# Patient Record
Sex: MALE | URBAN - METROPOLITAN AREA
[De-identification: names, ages, dates, MRNs, and addresses within clinical notes are randomized per-mention and may not be internally consistent; named-entity substitution may affect disease eponyms.]

---

## 2024-01-12 ENCOUNTER — OFFICE VISIT (OUTPATIENT)
Dept: URBAN - METROPOLITAN AREA CLINIC 12 | Facility: CLINIC | Age: 34
End: 2024-01-12

## 2024-01-26 ENCOUNTER — OFFICE VISIT (OUTPATIENT)
Dept: URBAN - METROPOLITAN AREA CLINIC 12 | Facility: CLINIC | Age: 34
End: 2024-01-26

## 2024-01-30 ENCOUNTER — OFFICE VISIT (OUTPATIENT)
Dept: URBAN - METROPOLITAN AREA CLINIC 111 | Facility: CLINIC | Age: 34
End: 2024-01-30

## 2024-01-30 NOTE — HPI-TODAY'S VISIT:
34 y/o male presents for a colon cancer screening. This patient was referred by Dr. Sabine Harding. A copy of this will be sent to the referring provider.There is a family history of colon cancer. Patient denies change in bowel habits, appetite, and weight. Patient denies rectal bleeding. Patient denies any cardiac, lung, or kidney problems. Patient denies any digestive symptoms currently. Last colonoscopy: